# Patient Record
Sex: FEMALE | NOT HISPANIC OR LATINO | URBAN - METROPOLITAN AREA
[De-identification: names, ages, dates, MRNs, and addresses within clinical notes are randomized per-mention and may not be internally consistent; named-entity substitution may affect disease eponyms.]

---

## 2018-06-19 ENCOUNTER — OFFICE VISIT (OUTPATIENT)
Dept: URBAN - METROPOLITAN AREA CLINIC 17 | Facility: CLINIC | Age: 58
End: 2018-06-19
Payer: COMMERCIAL

## 2018-06-19 DIAGNOSIS — R73.03 PREDIABETES: Primary | ICD-10-CM

## 2018-06-19 PROCEDURE — 92014 COMPRE OPH EXAM EST PT 1/>: CPT | Performed by: OPTOMETRIST

## 2018-06-19 ASSESSMENT — INTRAOCULAR PRESSURE
OS: 18
OD: 17

## 2018-06-19 NOTE — IMPRESSION/PLAN
Impression: Prediabetes: R73.03. Plan:  no background retinopathy, no signs of neovascularization noted. Discussed ocular and systemic benefits of blood sugar control.

## 2018-12-17 ENCOUNTER — OFFICE VISIT (OUTPATIENT)
Dept: URBAN - METROPOLITAN AREA CLINIC 17 | Facility: CLINIC | Age: 58
End: 2018-12-17
Payer: COMMERCIAL

## 2018-12-17 DIAGNOSIS — L25.9 CONTACT DERMATITIS: Primary | ICD-10-CM

## 2018-12-17 PROCEDURE — 92012 INTRM OPH EXAM EST PATIENT: CPT | Performed by: OPTOMETRIST

## 2018-12-17 RX ORDER — FLUOROMETHOLONE 1 MG/ML
0.1 % SOLUTION/ DROPS OPHTHALMIC
Qty: 1 | Refills: 0 | Status: INACTIVE
Start: 2018-12-17 | End: 2019-01-02

## 2018-12-17 ASSESSMENT — INTRAOCULAR PRESSURE
OD: 12
OS: 12

## 2018-12-17 NOTE — IMPRESSION/PLAN
Impression: Contact dermatitis: L25.9. possible association to Lupus(has not been diagnosed) Plan: start [id number] gtts BID OU

## 2019-01-02 ENCOUNTER — OFFICE VISIT (OUTPATIENT)
Dept: URBAN - METROPOLITAN AREA CLINIC 17 | Facility: CLINIC | Age: 59
End: 2019-01-02
Payer: COMMERCIAL

## 2019-01-02 PROCEDURE — 99213 OFFICE O/P EST LOW 20 MIN: CPT | Performed by: OPTOMETRIST

## 2019-01-02 RX ORDER — FLUOROMETHOLONE 1 MG/ML
0.1 % SOLUTION/ DROPS OPHTHALMIC
Qty: 1 | Refills: 1 | Status: INACTIVE
Start: 2019-01-02 | End: 2020-10-02

## 2019-01-02 ASSESSMENT — INTRAOCULAR PRESSURE
OS: 14
OD: 12

## 2019-01-02 NOTE — IMPRESSION/PLAN
Impression: Contact dermatitis: L25.9. possible association to Lupus(has not been diagnosed) Plan: taper  FML gtts  QD OU, then every other day.

## 2019-06-21 ENCOUNTER — OFFICE VISIT (OUTPATIENT)
Dept: URBAN - METROPOLITAN AREA CLINIC 17 | Facility: CLINIC | Age: 59
End: 2019-06-21
Payer: COMMERCIAL

## 2019-06-21 PROCEDURE — 99213 OFFICE O/P EST LOW 20 MIN: CPT | Performed by: OPTOMETRIST

## 2019-06-21 ASSESSMENT — KERATOMETRY
OD: 45.00
OS: 44.88

## 2019-06-21 ASSESSMENT — INTRAOCULAR PRESSURE
OD: 15
OS: 15

## 2019-06-21 NOTE — IMPRESSION/PLAN
Impression: Contact dermatitis: L25.9. possible association to Lupus(has not been diagnosed)
hx of rosacea Plan: Continue FML, Will refer to Dr. Ela Greenfield due to non-resolving ocular inflammation associated with Rosacea.

## 2019-08-08 ENCOUNTER — OFFICE VISIT (OUTPATIENT)
Dept: URBAN - METROPOLITAN AREA CLINIC 17 | Facility: CLINIC | Age: 59
End: 2019-08-08
Payer: COMMERCIAL

## 2019-08-08 PROCEDURE — 92012 INTRM OPH EXAM EST PATIENT: CPT | Performed by: OPHTHALMOLOGY

## 2019-08-08 PROCEDURE — 92002 INTRM OPH EXAM NEW PATIENT: CPT | Performed by: OPHTHALMOLOGY

## 2019-08-08 RX ORDER — DOXYCYCLINE HYCLATE 100 MG/1
100 MG TABLET, COATED ORAL
Qty: 60 | Refills: 3 | Status: INACTIVE
Start: 2019-08-08 | End: 2020-10-02

## 2019-08-08 ASSESSMENT — INTRAOCULAR PRESSURE
OS: 16
OD: 16

## 2019-08-08 NOTE — IMPRESSION/PLAN
Impression: Blepharitis of right upper eyelid: H01.001. OD. Condition: new problem addtl w/u needed. Symptoms: will treat with meds. Vision: vision not affected. Plan: Discussed diagnosis in detail with patient. Discussed treatment options with patient. Recommend increasing doxycycline tabs to 100mg daily, will ERx new prescription today. Use hot compress for x 5 minutes followed by firm upward massage to lower eyelid and firm downward massage to upper eyelids. Use this treatment once daily to all 4 eyelids. Patient instructed to use artificial tears at least QID for comfort. Will continue to observe condition and or symptoms. Discontinue FML OU.

## 2019-08-08 NOTE — IMPRESSION/PLAN
Impression: Blepharitis of left upper eyelid: H01.004. OS. Condition: new problem addtl w/u needed. Symptoms: will treat with meds. Plan: Discussion, orders, and instructions listed in plan #1.

## 2019-10-03 ENCOUNTER — OFFICE VISIT (OUTPATIENT)
Dept: URBAN - METROPOLITAN AREA CLINIC 17 | Facility: CLINIC | Age: 59
End: 2019-10-03
Payer: COMMERCIAL

## 2019-10-03 DIAGNOSIS — H01.001 BLEPHARITIS OF RIGHT UPPER EYELID: Primary | Chronic | ICD-10-CM

## 2019-10-03 DIAGNOSIS — H01.004 BLEPHARITIS OF LEFT UPPER EYELID: Chronic | ICD-10-CM

## 2019-10-03 PROCEDURE — 99213 OFFICE O/P EST LOW 20 MIN: CPT | Performed by: OPHTHALMOLOGY

## 2019-10-03 ASSESSMENT — INTRAOCULAR PRESSURE
OD: 20
OS: 18

## 2019-10-03 NOTE — IMPRESSION/PLAN
Impression: Blepharitis of left upper eyelid: H01.004 OS. Condition: established, stable. Symptoms: will continue to monitor. Vision: vision not affected.  Plan: see plan #1

## 2019-10-03 NOTE — IMPRESSION/PLAN
Impression: Blepharitis of right upper eyelid: H01.001 OD. Condition: established, stable. Symptoms: will continue to monitor. Vision: vision not affected. Plan: Discussed diagnosis in detail with patient. Discussed treatment options with patient. Patient unable to take doxycycline due to liver. Continue hot compress and massage daily to all eyelids. Will continue to observe condition and or symptoms. Use artificial tears at least QID for comfort.

## 2020-10-02 ENCOUNTER — OFFICE VISIT (OUTPATIENT)
Dept: URBAN - METROPOLITAN AREA CLINIC 17 | Facility: CLINIC | Age: 60
End: 2020-10-02
Payer: COMMERCIAL

## 2020-10-02 DIAGNOSIS — H10.45 OTHER CHRONIC ALLERGIC CONJUNCTIVITIS: ICD-10-CM

## 2020-10-02 DIAGNOSIS — H04.123 DRY EYE SYNDROME OF BILATERAL LACRIMAL GLANDS: ICD-10-CM

## 2020-10-02 DIAGNOSIS — H25.13 AGE-RELATED NUCLEAR CATARACT, BILATERAL: Primary | ICD-10-CM

## 2020-10-02 PROCEDURE — 92014 COMPRE OPH EXAM EST PT 1/>: CPT | Performed by: OPTOMETRIST

## 2020-10-02 RX ORDER — PREDNISOLONE ACETATE 10 MG/ML
1 % SUSPENSION/ DROPS OPHTHALMIC
Qty: 1 | Refills: 0 | Status: INACTIVE
Start: 2020-10-02 | End: 2020-10-08

## 2020-10-02 RX ORDER — OLOPATADINE HYDROCHLORIDE 2 MG/ML
0.2 % SOLUTION/ DROPS OPHTHALMIC
Qty: 1 | Refills: 11 | Status: ACTIVE
Start: 2020-10-02

## 2020-10-02 ASSESSMENT — INTRAOCULAR PRESSURE
OS: 18
OD: 18